# Patient Record
Sex: FEMALE | Race: WHITE | URBAN - METROPOLITAN AREA
[De-identification: names, ages, dates, MRNs, and addresses within clinical notes are randomized per-mention and may not be internally consistent; named-entity substitution may affect disease eponyms.]

---

## 2023-01-10 ENCOUNTER — APPOINTMENT (RX ONLY)
Dept: URBAN - METROPOLITAN AREA CLINIC 101 | Facility: CLINIC | Age: 48
Setting detail: DERMATOLOGY
End: 2023-01-10

## 2023-04-11 ENCOUNTER — APPOINTMENT (RX ONLY)
Dept: URBAN - METROPOLITAN AREA CLINIC 101 | Facility: CLINIC | Age: 48
Setting detail: DERMATOLOGY
End: 2023-04-11

## 2023-10-16 ENCOUNTER — APPOINTMENT (RX ONLY)
Dept: URBAN - METROPOLITAN AREA CLINIC 101 | Facility: CLINIC | Age: 48
Setting detail: DERMATOLOGY
End: 2023-10-16

## 2023-10-16 VITALS — HEIGHT: 59 IN | WEIGHT: 89 LBS

## 2023-10-16 PROCEDURE — ? ADDITIONAL NOTES

## 2023-10-16 NOTE — PROCEDURE: ADDITIONAL NOTES
Render Risk Assessment In Note?: no
Additional Notes: Patient's concerns include: \\n- Desire for increased upper pole fullness\\n\\nFactors altering surgical decisions (hx/exam findings): \\n- Bilateral breast hypomastia; symmetric breast volume with no ptosis \\n- Explant with vertical lift in April 2022\\n\\nProposed intervention(s):\\n- Recommend bilateral breast augmentation with dual plane submuscular placement of small silicone implant. After discussing different types of implants, the patient selected the [employer]le  SoftTouch silicone implants. Recommend that the patient provide photos she desires to look like prior to surgery although did discuss that final results are not guaranteed to match the provided photos and results vary based on patient anatomy. \\n- Discussed the specific procedure and incision pattern in detail, which would be an IMF incision\\n- Discussed postop care including 4-6 weeks of activity restrictions and use of surgical bra. Discussed risks of augmentation surgery including infection, bleeding, scar, damage to nearby structures, need for future surgery, hematoma, seroma, asymmetry, loss of NAC, sensory change of NAC, skin necrosis, delayed wound healing, inability to breast feed, recurrent ptosis, no guarantee of cup size, pain, and poor cosmetic result. Discussed use of implants (silicone vs saline), and related complications including malposition, capsular contracture, implant failure or deflation, breast implant illness, toxic shock syndrome, implant related cancer including ALCL, and rippling. Patient will be seen on POD#1 for a hematoma check and to be placed into a bra. \\n- Discussed postop pain control including optional use of Exparel, which is a long acting local anesthetic injected at the time of surgery\\n- The patient was provided with pricing info for the procedures discussed as well as Exparel. \\n- Updated mammogram may needed prior to surgery depending on when she schedules as well as the patient needs to send in her goal photos\\n\\n\\n\\n\\nPre-op Counseling:\\n- Aesthetic Breast Deformity Care: Breast cosmetic dissatisfaction may be due to volume inadequacy, volume excess, breast descent (sagging), excess skin, nipple distortion, scarring, asymmetry, and other complaints. Breast aesthetics are the result of complex interplay between the skin envelope, the nipple position, the volume and projection of the breasts, the chest wall anatomy, age, hormonal status, and other factors. Clear articulation of patient aesthetic goals is important to help the surgeon meet patient expectations. Photos of desired results may be informative but can not be fully relied upon as a guide for surgery. No guarantee has been made or implied regarding matching final results to photographic examples or specific cup sizes. Cosmetic breast surgery is commonly performed on an outpatient basis, although overnight hospitalization may be indicated in some patients, particularly those undergoing large body contouring operations. Breast enlargement may improve somewhat with diet control, exercise, and proper care, including adequate supportive breast garments, avoidance of excess sun to the breast skin, and abstinence from nicotine. Specific preoperative and postoperative instructions will be provided for surgery. Breast implants are commonly used for enhancement of the breast. Risks associated with implant use are enumerated in the specific counseling blocks below. Fat transfer to the breast area is commonly done by combining liposuction of problem areas with injection of the suctioned fat into the breasts to fill contour defects, or to create a tapia, rounder look. Irregularity, asymmetry, and loss of volume in augmented areas may still occur, and aging changes in the breast will continue as the patient gets older. \\n- Expectations: Breast surgery is typically done under general anesthesia, although some patients may undergo surgery under sedation with local anesthesia. Healing typically takes anywhere between 3 to 6 weeks for incisions and several more weeks are often required for settling of the breasts. Scars may mature over the course of 6-12 months.